# Patient Record
Sex: MALE | Race: WHITE | NOT HISPANIC OR LATINO | Employment: FULL TIME | URBAN - METROPOLITAN AREA
[De-identification: names, ages, dates, MRNs, and addresses within clinical notes are randomized per-mention and may not be internally consistent; named-entity substitution may affect disease eponyms.]

---

## 2022-03-22 ENCOUNTER — OCCUPATIONAL MEDICINE (OUTPATIENT)
Dept: URGENT CARE | Facility: PHYSICIAN GROUP | Age: 42
End: 2022-03-22
Payer: OTHER MISCELLANEOUS

## 2022-03-22 VITALS
HEIGHT: 73 IN | RESPIRATION RATE: 16 BRPM | WEIGHT: 237 LBS | HEART RATE: 76 BPM | SYSTOLIC BLOOD PRESSURE: 124 MMHG | OXYGEN SATURATION: 96 % | BODY MASS INDEX: 31.41 KG/M2 | TEMPERATURE: 98.4 F | DIASTOLIC BLOOD PRESSURE: 70 MMHG

## 2022-03-22 DIAGNOSIS — G57.01 PIRIFORMIS SYNDROME OF RIGHT SIDE: ICD-10-CM

## 2022-03-22 DIAGNOSIS — M25.551 RIGHT HIP PAIN: ICD-10-CM

## 2022-03-22 DIAGNOSIS — M25.561 ACUTE PAIN OF RIGHT KNEE: ICD-10-CM
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
## 2022-03-22 PROCEDURE — 99203 OFFICE O/P NEW LOW 30 MIN: CPT | Performed by: FAMILY MEDICINE

## 2022-03-22 NOTE — LETTER
[facility] [address] [place] [address]  Phone:  [phone] - Fax:  [phone]   [facility] Progress Report and Disability Certification  Date of Service: 3/22/2022   No Show:  No  Date / Time of Next Visit: 3/29/2022 with occ med   Claim Information   Patient Name: Jericho Moraes  Claim Number:     Employer:    Date of Injury: 3/14/2022     Insurer / TPA: Misc Workers Comp  ID / SSN:     Occupation: Captain  Diagnosis: Diagnoses of Work related injury, Right hip pain, Acute pain of right knee, and Piriformis syndrome of right side were pertinent to this visit.    Medical Information   Related to Industrial Injury? Yes    Subjective Complaints:  DOI: 3/14/2022  VANDA: He was at [employer] doing training, he had climbed over a wall, coming down primarily on his right leg, however conditions were poor and he felt like he twisted his right knee/hip.  He had a 4 mile run to finish after that, which he did complete.  Since that time he has had constant pain to both his right hip and knee.  Hip pain is described as sharp, knee as burning, at its worse both pains are rated 7/10.  He has used ibuprofen without significant improvement in pain.  No prior injury to right hip or knee.  Denies secondary employment.     Objective Findings: No discolorations or deformities noted to right hip or knee.  Patella is freely mobile and nontender.  Nontender to palpation of joint lines.  Anterior and posterior drawer negative.  Active range of motion remains to both right hip and knee.  He is point tender to his right gluteus sudheer.   Pre-Existing Condition(s):     Assessment:   Initial Visit    Status: Additional Care Required  Permanent Disability:No    Plan:      Diagnostics:      Comments:  -At this time I do not believe there is a need for imaging based off mechanism  -Work restrictions include maximum of 6 hours daily of squatting and climbing  -Tylenol and  We will call you with lab testing. Depending on results will either follow up with me or in pulmonary clinic    Start Trelegy 1 puff once a day - rinse mouth after use (stop Symbicort)    Increase loratadine to twice a day    Moisturize after shower with petroleum jelly       ibuprofen as needed for symptomatic relief  -Transfer to occupational health      Disability Information   Status: Released to Restricted Duty    From:  3/22/2022  Through: 3/29/2022 Restrictions are: Temporary   Physical Restrictions   Sitting:    Standing:    Stooping:    Bending:      Squatting:  < or = to 6 hrs/day Walking:    Climbing:  < or = to 6 hrs/day Pushing:      Pulling:    Other:    Reaching Above Shoulder (L):   Reaching Above Shoulder (R):       Reaching Below Shoulder (L):    Reaching Below Shoulder (R):      Not to exceed Weight Limits   Carrying(hrs):   Weight Limit(lb):   Lifting(hrs):   Weight  Limit(lb):     Comments:      Repetitive Actions   Hands: i.e. Fine Manipulations from Grasping:     Feet: i.e. Operating Foot Controls:     Driving / Operate Machinery:     Health Care Provider’s Original or Electronic Signature  Mimi Pearl M.D. Health Care Provider’s Original or Electronic Signature    Jeremi Collier MD         Clinic Name / Location: [address] [address] Clinic Phone Number: Dept: [phone]   Appointment Time: 2:00 Pm Visit Start Time: 2:32 PM   Check-In Time:  2:15 Pm Visit Discharge Time:  3:02 PM   Original-Treating Physician or Chiropractor    Page 2-Insurer/TPA    Page 3-Employer    Page 4-Employee

## 2022-03-22 NOTE — LETTER
"EMPLOYEE’S CLAIM FOR COMPENSATION/ REPORT OF INITIAL TREATMENT  FORM C-4    EMPLOYEE’S CLAIM - PROVIDE ALL INFORMATION REQUESTED   First Name  Jericho Last Name  Dimitry Birthdate                    1980                Sex  male Claim Number (Insurer’s Use Only)    Home Address  [address] Age  41 y.o. Height  1.854 m (6' 1\") Weight  108 kg (237 lb) [facility]     [facility] Zip  [address] Telephone  [phone] (home)    Mailing Address  [address] 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 (if applicable)  Running the obstacle course    How did this injury or occupational disease occur? (Be specific an answer in detail. Use additional sheet if necessary)  Jumping over a wall obstacle/barricade, landed on right leg which twisted in the mud and Ice   If you believe that you have an occupational disease, when did you first have knowledge of the disability and it relationship to your employment?  N/A Witnesses to the Accident  Zackary Concepcion      Nature of Injury or Occupational Disease  Workers' Compensation  Part(s) of Body Injured or Affected  Knee (R), Upper Leg (R), N/A    I certify that the above is true and correct to the best of my knowledge and that I have provided " this information in order to obtain the benefits of [place]  I hereby authorize any physician, chiropractor, surgeon, practitioner, or other person, any hospital, including [facility], any medical service organization, any insurance company, or other institution or organization to release to each other, any medical or other information, including benefits paid or payable, pertinent to this injury or disease, except information relative to diagnosis, treatment and/or counseling for AIDS, psychological conditions, alcohol or controlled substances, for which I must give specific authorization.  A Photostat of this authorization shall be as valid as the original.     Date   Place Employee’s Original or  *Electronic Signature   THIS REPORT MUST BE COMPLETED AND MAILED WITHIN 3 WORKING DAYS OF TREATMENT   Place  [facility]  Name of Facility  [facility]   Date  3/22/2022 Diagnosis and Description of Injury or Occupational Disease  (Y99.0) Work related injury  (M25.551) Right hip pain  (M25.561) Acute pain of right knee  (G57.01) Piriformis syndrome of right side Is there evidence the injured employee was under the influence of alcohol and/or another controlled substance at the time of accident?  ? No ? Yes (if yes, please explain)    Hour  2:32 PM   Diagnoses of Work related injury, Right hip pain, Acute pain of right knee, and Piriformis syndrome of right side were pertinent to this visit. No   Treatment  -At this time I do not believe there is a need for imaging based off mechanism  -Work restrictions include maximum of 6 hours daily of squatting and climbing  -Tylenol and ibuprofen as needed for symptomatic relief  -Transfer to occupational health  Have you advised the patient to remain off work five days or     more?    X-Ray Findings      ? Yes Indicate dates:    "From   To      From information given by the employee, together with medical evidence, can        you directly connect this injury or occupational disease as job incurred?  Yes ? No If no, is the injured employee capable of:  ? full duty  No ? modified duty  Yes   Is additional medical care by a physician indicated?  Yes If Modified Duty, Specify any Limitations / Restrictions  -Work restrictions include maximum of 6 hours daily of squatting and climbing   Do you know of any previous injury or disease contributing to this condition or occupational disease?  ? Yes ? No (Explain if yes)                          No   Date  3/22/2022 Print Health Care Provider's   Pool Pearl M.D. I certify the employer’s copy of  this form was mailed on:   Address  [address]  [facility] Insurer’s Use Only     [facility] Zip  [address]    Provider’s Tax ID Number  [id number] Telephone  Dept: [phone]             Health Care Provider’s Original or Electronic Signature  e-POOL Webster M.D. Degree (MD,DO, DC,PA-C,APRN)   MD      * Complete and attach Release of Information (Form C-4A) when injured employee signs C-4 Form electronically  ORIGINAL - TREATING HEALTHCARE PROVIDER PAGE 2 - INSURER/TPA PAGE 3 - EMPLOYER PAGE 4 - EMPLOYEE             Form C-4 (rev.08/21)           BRIEF DESCRIPTION OF RIGHTS AND BENEFITS  (Pursuant to [place] 616C.050)    Notice of Injury or Occupational Disease (Incident Report Form C-1): If an injury or occupational disease (OD) arises out of and in the course of employment, you must provide written notice to your employer as soon as practicable, but no later than 7 days after the accident or OD. Your employer shall maintain a sufficient supply of the required forms.    Claim for Compensation (Form C-4): If medical treatment is sought, the form C-4 is available at the place of initial treatment. A completed \"Claim for Compensation\" (Form C-4) must be filed within 90 days after an " accident or OD. The treating physician or chiropractor must, within 3 working days after treatment, complete and mail to the employer, the employer's insurer and third-party , the Claim for Compensation.    Medical Treatment: If you require medical treatment for your on-the-job injury or OD, you may be required to select a physician or chiropractor from a list provided by your workers’ compensation insurer, if it has contracted with an Organization for Managed Care (MCO) or Preferred Provider Organization (PPO) or providers of health care. If your employer has not entered into a contract with an MCO or PPO, you may select a physician or chiropractor from the Panel of Physicians and Chiropractors. Any medical costs related to your industrial injury or OD will be paid by your insurer.    Temporary Total Disability (TTD): If your doctor has certified that you are unable to work for a period of at least 5 consecutive days, or 5 cumulative days in a 20-day period, or places restrictions on you that your employer does not accommodate, you may be entitled to TTD compensation.    Temporary Partial Disability (TPD): If the wage you receive upon reemployment is less than the compensation for TTD to which you are entitled, the insurer may be required to pay you TPD compensation to make up the difference. TPD can only be paid for a maximum of 24 months.    Permanent Partial Disability (PPD): When your medical condition is stable and there is an indication of a PPD as a result of your injury or OD, within 30 days, your insurer must arrange for an evaluation by a rating physician or chiropractor to determine the degree of your PPD. The amount of your PPD award depends on the date of injury, the results of the PPD evaluation, your age and wage.    Permanent Total Disability (PTD): If you are medically certified by a treating physician or chiropractor as permanently and totally disabled and have been granted a PTD  status by your insurer, you are entitled to receive monthly benefits not to exceed 66 2/3% of your average monthly wage. The amount of your PTD payments is subject to reduction if you previously received a lump-sum PPD award.    Vocational Rehabilitation Services: You may be eligible for vocational rehabilitation services if you are unable to return to the job due to a permanent physical impairment or permanent restrictions as a result of your injury or occupational disease.    Transportation and Per Cb Reimbursement: You may be eligible for travel expenses and per cb associated with medical treatment.    Reopening: You may be able to reopen your claim if your condition worsens after claim closure.     Appeal Process: If you disagree with a written determination issued by the insurer or the insurer does not respond to your request, you may appeal to the Department of Administration, , by following the instructions contained in your determination letter. You must appeal the determination within 70 days from the date of the determination letter at [address], [address], [place], [place] [address], or [address], [address], [place] [address]. If you disagree with the  decision, you may appeal to the Department of Administration, . You must file your appeal within 30 days from the date of the  decision letter at [address], [address], [place], [place] [address], or [address], [address], [place] [address]. If you disagree with a decision of an , you may file a petition for judicial review with the District Court. You must do so within 30 days of the Appeal Officer’s decision. You may be represented by an  at your own expense or you may contact the [facility] for possible representation.    [place]  for Injured Workers (NAIW): If you disagree with a  decision, you  may request that [facility] represent you without charge at an  Hearing. For information regarding denial of benefits, you may contact the [facility] at: [address], [address], [place] [address], [phone], or [address], [address], [place] [address], [phone]    To File a Complaint with the Division: If you wish to file a complaint with the  of the Division of Industrial Relations (DIR),  please contact the Workers’ Compensation Section, [address], [address], [place], [place] [address], telephone [phone], or [address] [place], [address], [place], [place] [address], telephone [phone].    For assistance with Workers’ Compensation Issues: You may contact the [facility] Office for Consumer Health Assistance, [address] [place], [address], [place], [place] [address], Toll Free 1-332.717.4896, Web site: http://[facility].[place].gov/Programs/ABBI E-mail: [email or web address]              __________________________________________________________________                                    _________________            Employee Name / Signature                                                                                                                            Date                                                                                                                                                                                                                              D-2 (rev. 10/20)

## 2022-03-22 NOTE — PROGRESS NOTES
"  Subjective:     41 y.o. male presents for Knee Injury (NEW WC DOI 3/14/2022 (R) knee with pain going up the (R) leg)      DOI: 3/14/2022  VANDA: He was at [employer] doing training, he had climbed over a wall, coming down primarily on his right leg, however conditions were poor and he felt like he twisted his right knee/hip.  He had a 4 mile run to finish after that, which he did complete.  Since that time he has had constant pain to both his right hip and knee.  Hip pain is described as sharp, knee as burning, at its worse both pains are rated 7/10.  He has used ibuprofen without significant improvement in pain.  No prior injury to right hip or knee.  Denies secondary employment.      PMH:   No pertinent past medical history to this problem  MEDS:  Medications were reviewed in EMR  ALLERGIES:  Allergies were reviewed in EMR  SOCHX:  Works as a captain, [employer] department  FH:   No pertinent family history to this problem     Objective:     /70 (BP Location: Left arm, Patient Position: Sitting, BP Cuff Size: Large adult)   Pulse 76   Temp 36.9 °C (98.4 °F) (Temporal)   Resp 16   Ht 1.854 m (6' 1\")   Wt 108 kg (237 lb)   SpO2 96%   BMI 31.27 kg/m²     No discolorations or deformities noted to right hip or knee.  Patella is freely mobile and nontender.  Nontender to palpation of joint lines.  Anterior and posterior drawer negative.  Active range of motion remains to both right hip and knee.  He is point tender to his right gluteus sudheer.    Assessment/Plan:     1. Work related injury  - Referral to Occupational Medicine    2. Right hip pain  - Referral to Occupational Medicine    3. Acute pain of right knee  - Referral to Occupational Medicine    4. Piriformis syndrome of right side  - Referral to Occupational Medicine    • Released to Restricted Duty FROM 3/22/2022 TO 3/29/2022     -At this time I do not believe there is a need for imaging based off mechanism  -Work restrictions include maximum of 6 " hours daily of squatting and climbing  -Tylenol and ibuprofen as needed for symptomatic relief  -Transfer to occupational health      Differential diagnosis, natural history, supportive care, and indications for immediate follow-up discussed.

## 2022-04-01 ENCOUNTER — OCCUPATIONAL MEDICINE (OUTPATIENT)
Dept: OCCUPATIONAL MEDICINE | Facility: CLINIC | Age: 42
End: 2022-04-01
Payer: OTHER MISCELLANEOUS

## 2022-04-01 VITALS
HEIGHT: 73 IN | HEART RATE: 66 BPM | SYSTOLIC BLOOD PRESSURE: 128 MMHG | BODY MASS INDEX: 31.98 KG/M2 | WEIGHT: 241.3 LBS | DIASTOLIC BLOOD PRESSURE: 76 MMHG | TEMPERATURE: 97.6 F | OXYGEN SATURATION: 96 %

## 2022-04-01 DIAGNOSIS — S76.011D HIP STRAIN, RIGHT, SUBSEQUENT ENCOUNTER: ICD-10-CM

## 2022-04-01 PROCEDURE — 99204 OFFICE O/P NEW MOD 45 MIN: CPT | Performed by: PREVENTIVE MEDICINE

## 2022-04-01 NOTE — PROGRESS NOTES
"Subjective:     Jericho Moraes is a 41 y.o. male who presents for Follow-Up (WC DOI: 3/14/2022 RIGHT KNEE WORSE )      DOI: 3/14/2022: 41-year-old injured worker presents with right hip pain knee pain.  VANDA: He was at [employer] doing training, he had climbed over a wall, coming down primarily on his right leg, however conditions were poor and he felt like he twisted his right knee/hip.  He self treated but symptoms not improve and so was seen in urgent care.  Recommended NSAIDs, work restrictions and occupational health visit.  Patient states that a lot of the pain is on the lateral side of the head.  Pain/burning extends from the hip joint down to the lateral side of the knee.  He states even with normal walking he gets pain in the hip and will get a \"clunking\" sensation frequently in the hip.  He states it feels like it is going to reteplase.  He states lateral side and more associated with a burning sensation.  Denies instability of the knee.  Denies prior knee or hip injuries.  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  Denies any ibuprofen or Aleve.  He states he has not noted much improvement since initial injury and is in fact thinks it is getting worse.    ROS: All systems were reviewed on intake form, form was reviewed and signed. See scanned documents in media. Pertinent positives and negatives included in HPI.    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 Appears well-developed and well-nourished.   HENT: Normocephalic " and atraumatic. EOM are normal. No scleral icterus.   Cardiovascular: Normal rate.    Pulmonary/Chest: Effort normal. No respiratory distress.   Neurological: Patient is alert and oriented to person, place, and time.   Skin: Skin is warm and dry.   Psychiatric: Normal mood and affect. Behavior is normal.     Right hip: No gross deformity.  Tenderness from the right SI joint down to the greater trochanter.  Range of motion diminished with internal/external rotation.  Positive POPEYE test.  Slight antalgic gait  Right knee: No tenderness palpation.  No gross deformity.  Full range of motion.  Anterior/posterior drawer test negative.  No laxity with varus or valgus stress.    Assessment/Plan:       1. Hip strain, right, subsequent encounter  - DX-INJECT OR ASPIRATE W/O US GUIDANCE-LARGE JOINT RIGHT; Future  - MR-HIP-WITH RIGHT; Future  - Referral to Physical Therapy    Released to Restricted Duty FROM 4/1/2022 TO 4/29/2022  No running, climbing  Given mechanism of injury, current symptoms and exam findings concern for labral injury of the right hip  Ordered MRI right hip with joint arthrography  Placed referral for physical therapy  OTC ibuprofen/Tylenol as needed  Restricted duty  Follow-up   4 weeks, sooner if MRI performed sooner    Differential diagnosis, natural history, supportive care, and indications for immediate follow-up discussed.    Approximately 45 minutes were spent in reviewing notes, preparing for visit, obtaining history, exam and evaluation, patient counseling/education and post visit documentation/orders.

## 2022-04-01 NOTE — LETTER
"[address],   [address] [place] [address]  Phone:  [phone] - Fax:  [phone]   Occupational Health [facility] Progress Report and Disability Certification  Date of Service: 4/1/2022   No Show:  No  Date / Time of Next Visit: 4/29/2022   Claim Information   Patient Name: Jericho Moraes  Claim Number:     Employer:   [employer] Date of Injury: 3/14/2022     Insurer / TPA:   [employer]  ID / SSN:     Occupation: Captain  Diagnosis: The encounter diagnosis was Hip strain, right, subsequent encounter.    Medical Information   Related to Industrial Injury? Yes    Subjective Complaints:  DOI: 3/14/2022: 41-year-old injured worker presents with right hip pain knee pain.  VANDA: He was at [employer] doing training, he had climbed over a wall, coming down primarily on his right leg, however conditions were poor and he felt like he twisted his right knee/hip.  He self treated but symptoms not improve and so was seen in urgent care.  Recommended NSAIDs, work restrictions and occupational health visit.  Patient states that a lot of the pain is on the lateral side of the head.  Pain/burning extends from the hip joint down to the lateral side of the knee.  He states even with normal walking he gets pain in the hip and will get a \"clunking\" sensation frequently in the hip.  He states it feels like it is going to reteplase.  He states lateral side and more associated with a burning sensation.  Denies instability of the knee.  Denies prior knee or hip injuries.  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  Denies any ibuprofen or Aleve.  He states he has not noted much improvement since initial injury and is in fact thinks it is getting worse.   Objective Findings: Right hip: No gross deformity.  Tenderness from the right SI joint down to the greater trochanter.  Range of motion diminished with " internal/external rotation.  Positive POPEYE test.  Slight antalgic gait  Right knee: No tenderness palpation.  No gross deformity.  Full range of motion.  Anterior/posterior drawer test negative.  No laxity with varus or valgus stress.   Pre-Existing Condition(s):     Assessment:   Condition Same    Status: Additional Care Required  Permanent Disability:No    Plan:      Diagnostics:      Comments:  Given mechanism of injury, current symptoms and exam findings concern for labral injury of the right hip  Ordered MRI right hip with joint arthrography  Placed referral for physical therapy  OTC ibuprofen/Tylenol as needed  Restricted duty  Follow-up   4 weeks, sooner if MRI performed sooner    Disability Information   Status: Released to Restricted Duty    From:  2022  Through: 2022 Restrictions are: Temporary   Physical Restrictions   Sitting:    Standing:    Stooping:    Bending:      Squatting:  < or = to 4 hrs/day Walking:    Climbin hrs/day Pushing:      Pulling:    Other:    Reaching Above Shoulder (L):   Reaching Above Shoulder (R):       Reaching Below Shoulder (L):    Reaching Below Shoulder (R):      Not to exceed Weight Limits   Carrying(hrs):   Weight Limit(lb):   Lifting(hrs):   Weight  Limit(lb):     Comments: No running, climbing    Repetitive Actions   Hands: i.e. Fine Manipulations from Grasping:     Feet: i.e. Operating Foot Controls:     Driving / Operate Machinery:     Health Care Provider’s Original or Electronic Signature  Corey Chase D.O. Health Care Provider’s Original or Electronic Signature    Jeremi Collier MD         Clinic Name / Location: [address] [address] Clinic Phone Number: Dept: [phone]   Appointment Time: 10:30 Am Visit Start Time: 10:18 AM   Check-In Time:  10:16 Am Visit Discharge Time:  10:58am   Original-Treating Physician or Chiropractor    Page 2-Insurer/TPA    Page 3-Employer    Page  4-Employee

## 2022-05-24 ENCOUNTER — OCCUPATIONAL MEDICINE (OUTPATIENT)
Dept: OCCUPATIONAL MEDICINE | Facility: CLINIC | Age: 42
End: 2022-05-24
Payer: OTHER MISCELLANEOUS

## 2022-05-24 VITALS
BODY MASS INDEX: 31.54 KG/M2 | DIASTOLIC BLOOD PRESSURE: 80 MMHG | HEART RATE: 84 BPM | SYSTOLIC BLOOD PRESSURE: 136 MMHG | WEIGHT: 238 LBS | HEIGHT: 73 IN | RESPIRATION RATE: 18 BRPM

## 2022-05-24 DIAGNOSIS — S73.191D TEAR OF RIGHT ACETABULAR LABRUM, SUBSEQUENT ENCOUNTER: ICD-10-CM

## 2022-05-24 DIAGNOSIS — S76.011D HIP STRAIN, RIGHT, SUBSEQUENT ENCOUNTER: ICD-10-CM

## 2022-05-24 PROCEDURE — 99213 OFFICE O/P EST LOW 20 MIN: CPT | Performed by: PREVENTIVE MEDICINE

## 2022-05-24 NOTE — LETTER
[address],   [address] [place] [address]  Phone:  [phone] - Fax:  [phone]   Occupational Health [facility] Progress Report and Disability Certification  Date of Service: 5/24/2022   No Show:  No  Date / Time of Next Visit: 6/24/2022 @ 10am   Claim Information   Patient Name: Jericho Moraes  Claim Number:     Employer:   [employer] Date of Injury: 3/14/2022     Insurer / TPA: [employer]  ID / SSN:     Occupation: Captain  Diagnosis: Diagnoses of Hip strain, right, subsequent encounter and Tear of right acetabular labrum, subsequent encounter were pertinent to this visit.    Medical Information   Related to Industrial Injury? Yes    Subjective Complaints:  DOI: 3/14/2022: 41-year-old injured worker presents with right hip pain knee pain.  VANDA: He was at [employer] doing training, he had climbed over a wall, coming down primarily on his right leg, however conditions were poor and he felt like he twisted his right knee/hip.  Patient states that he has felt a gradual worsening in symptoms.  He states he has not tried to run.  He states he has difficulty with doing basic exercises that he was doing before.  He states he attended 6 sessions of PT and did not get much benefit.  Patient states ibuprofen or Tylenol does not seem to help either.  He states that MRI performed last week.   Objective Findings: Right hip: No gross deformity.  Area of pain over the right SI joint down to the greater trochanter.  Range of motion diminished with internal/external rotation.  Positive POPEYE test.      MRI right hip: Tear of the superior labrum.  Moderate to severe degenerative changes in the right hip.   Pre-Existing Condition(s):     Assessment:   Condition Same    Status: Additional Care Required  Permanent Disability:No    Plan:      Diagnostics:      Comments:  Given MRI findings will refer to orthopedics, patient requesting Dr. Mak of the [facility]  OTC ibuprofen/Tylenol as needed  Restricted duty  Follow-up 4 weeks, if not seen by orthopedics    Disability Information   Status: Released to Restricted Duty    From:  2022  Through: 2022 Restrictions are: Temporary   Physical Restrictions   Sitting:    Standing:    Stooping:    Bending:      Squatting:  < or = to 4 hrs/day Walking:    Climbin hrs/day Pushing:      Pulling:    Other:    Reaching Above Shoulder (L):   Reaching Above Shoulder (R):       Reaching Below Shoulder (L):    Reaching Below Shoulder (R):      Not to exceed Weight Limits   Carrying(hrs):   Weight Limit(lb):   Lifting(hrs):   Weight  Limit(lb):     Comments: No running, climbing     Repetitive Actions   Hands: i.e. Fine Manipulations from Grasping:     Feet: i.e. Operating Foot Controls:     Driving / Operate Machinery:     Health Care Provider’s Original or Electronic Signature  Corey Chase D.O. Health Care Provider’s Original or Electronic Signature    Jeremi Collier MD         Clinic Name / Location: [address],   [address] [place] [address] Clinic Phone Number: Dept: [phone]   Appointment Time: 10:00 Am Visit Start Time: 10:01 AM   Check-In Time:  9:57 Am Visit Discharge Time:  10:33am   Original-Treating Physician or Chiropractor    Page 2-Insurer/TPA    Page 3-Employer    Page 4-Employee

## 2022-05-24 NOTE — PROGRESS NOTES
"Subjective:     Jericho Moraes is a 41 y.o. 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  VANDA: He was at [employer] doing training, he had climbed over a wall, coming down primarily on his right leg, however conditions were poor and he felt like he twisted his right knee/hip.  Patient states that he has felt a gradual worsening in symptoms.  He states he has not tried to run.  He states he has difficulty with doing basic exercises that he was doing before.  He states he attended 6 sessions of PT and did not get much benefit.  Patient states ibuprofen or Tylenol does not seem to help either.  He states that MRI performed last week.    ROS         Objective:     /80   Pulse 84   Resp 18   Ht 1.854 m (6' 1\")   Wt 108 kg (238 lb)   BMI 31.40 kg/m²     Constitutional: Patient is in no acute distress. Appears well-developed and well-nourished.   Cardiovascular: Normal rate.    Pulmonary/Chest: Effort normal. No respiratory distress.   Neurological: Patient is alert and oriented to person, place, and time.   Skin: Skin is warm and dry.   Psychiatric: Normal mood and affect. Behavior is normal.     Right hip: No gross deformity.  Area of pain over the right SI joint down to the greater trochanter.  Range of motion diminished with internal/external rotation.  Positive POPEYE test.      MRI right hip: Tear of the superior labrum.  Moderate to severe degenerative changes in the right hip.    Assessment/Plan:       1. Hip strain, right, subsequent encounter  - Referral to Orthopedics    2. Tear of right acetabular labrum, subsequent encounter  - Referral to Orthopedics    Released to Restricted Duty FROM 5/24/2022 TO 6/24/2022  No running, climbing     Given MRI findings will refer to orthopedics, patient requesting Dr. Mak of the [facility]  OTC ibuprofen/Tylenol as needed  Restricted " duty  Follow-up 4 weeks, if not seen by orthopedics    Differential diagnosis, natural history, supportive care, and indications for immediate follow-up discussed.    Approximately 25 minutes was spent in preparing for visit, obtaining history, exam and evaluation, patient counseling/education and post visit documentation/orders.

## 2022-07-11 PROBLEM — M16.9 OSTEOARTHRITIS OF HIP: Status: ACTIVE | Noted: 2022-07-11

## 2022-07-12 PROBLEM — S83.241A ACUTE MEDIAL MENISCUS TEAR OF RIGHT KNEE: Status: ACTIVE | Noted: 2022-07-12

## 2023-01-05 PROBLEM — M16.9 OSTEOARTHROSIS, HIP: Status: ACTIVE | Noted: 2023-01-05

## 2023-03-14 PROBLEM — S83.241A ACUTE MEDIAL MENISCUS TEAR OF RIGHT KNEE, INITIAL ENCOUNTER: Status: ACTIVE | Noted: 2023-03-14
